# Patient Record
Sex: FEMALE | Race: ASIAN | NOT HISPANIC OR LATINO | Employment: UNEMPLOYED | URBAN - METROPOLITAN AREA
[De-identification: names, ages, dates, MRNs, and addresses within clinical notes are randomized per-mention and may not be internally consistent; named-entity substitution may affect disease eponyms.]

---

## 2024-02-20 NOTE — PROGRESS NOTES
"HPI   34 yo presents as new pt for hyperthyroidism.      Pt is 10 months post partum.  -dec 23: tsh-0.018, of note pt had normal tsh aug 2023  -no obstructive goiter sx, no eye sx  -euthyroid by history, some hair loss, occ nausea  -having periods again starting jan 2024      Pmh/psh:  -as above  FH:  Mom-thyroid disease  Dad-dm2, cvd  Siblings-healthy  SH: -tobacco, -etoh, -illicit substances        Current Outpatient Medications:     prenatal no115/iron/folic acid (PRENATAL 19 ORAL), Take by mouth., Disp: , Rfl:       Allergies as of 02/22/2024 - Reviewed 02/22/2024   Allergen Reaction Noted    Tylenol pm [diphenhydramine-acetaminophen] Rash 02/22/2024         Review of Systems   Cardiology: Lightheadedness-denies.  Chest pain-denies.  Leg edema-denies.  Palpitations-denies.  Respiratory: Cough-denies. Shortness of breath-denies.  Wheezing-denies.  Gastroenterology: Constipation-denies.  Diarrhea-denies.  Heartburn-denies.  Endocrinology: Cold intolerance-denies.  Heat intolerance-denies.  Sweats-denies.  Neurology: Headache-denies.  Tremor-denies.  Neuropathy in extremities-denies.  Psychology: Low energy-denies.  Irritability-denies.  Sleep disturbances-variable      BP 96/70 (BP Location: Left arm)   Pulse 89   Ht 1.537 m (5' 0.5\")   Wt 62.1 kg (137 lb)   BMI 26.32 kg/m²       Labs:    Lab Results   Component Value Date    HGBA1C 5.5 11/10/2021         Physical Exam   General Appearance: pleasant, cooperative, no acute distress  HEENT: no chemosis, no proptosis, no lid lag, no lid retraction  Neck: no lymphadenopathy, no thyromegaly, no dominant thyroid nodules  Heart: no murmurs, regular rate and rhythm, S1 and S2  Lungs: no wheezes, no rhonci, no rales  Extremities: no lower extremity swelling      Assessment/Plan   1. Hyperthyroidism  -ddx thyroiditis vs Graves disease  -based on sx/hx/exam suspect thyroiditis  -check tsh/t4/t4/tsi/tpo  -if thyroiditis would follow up in 3 months and then follow levels " yearly with pcp  -if Graves' likely start low dose methimazole, should be ok with breast feeding    Follow Up:  Lazara 3 months    -labs/tests/notes reviewed  -reviewed and counseled patient on medication monitoring and side effects

## 2024-02-22 ENCOUNTER — OFFICE VISIT (OUTPATIENT)
Dept: ENDOCRINOLOGY | Facility: CLINIC | Age: 34
End: 2024-02-22
Payer: COMMERCIAL

## 2024-02-22 ENCOUNTER — LAB (OUTPATIENT)
Dept: LAB | Facility: LAB | Age: 34
End: 2024-02-22
Payer: COMMERCIAL

## 2024-02-22 VITALS
HEART RATE: 89 BPM | WEIGHT: 137 LBS | DIASTOLIC BLOOD PRESSURE: 70 MMHG | HEIGHT: 61 IN | SYSTOLIC BLOOD PRESSURE: 96 MMHG | BODY MASS INDEX: 25.86 KG/M2

## 2024-02-22 DIAGNOSIS — E05.90 HYPERTHYROIDISM: Primary | ICD-10-CM

## 2024-02-22 DIAGNOSIS — E05.90 HYPERTHYROIDISM: ICD-10-CM

## 2024-02-22 LAB
T3FREE SERPL-MCNC: 3.5 PG/ML (ref 2.3–4.2)
THYROPEROXIDASE AB SERPL-ACNC: <28 IU/ML
TSH SERPL-ACNC: 1.81 MIU/L (ref 0.44–3.98)

## 2024-02-22 PROCEDURE — 36415 COLL VENOUS BLD VENIPUNCTURE: CPT

## 2024-02-22 PROCEDURE — 84481 FREE ASSAY (FT-3): CPT

## 2024-02-22 PROCEDURE — 84443 ASSAY THYROID STIM HORMONE: CPT

## 2024-02-22 PROCEDURE — 84445 ASSAY OF TSI GLOBULIN: CPT

## 2024-02-22 PROCEDURE — 99203 OFFICE O/P NEW LOW 30 MIN: CPT | Performed by: INTERNAL MEDICINE

## 2024-02-22 PROCEDURE — 1036F TOBACCO NON-USER: CPT | Performed by: INTERNAL MEDICINE

## 2024-02-22 PROCEDURE — 86376 MICROSOMAL ANTIBODY EACH: CPT

## 2024-02-22 RX ORDER — OMEPRAZOLE 20 MG/1
20 CAPSULE, DELAYED RELEASE ORAL 2 TIMES DAILY
COMMUNITY
Start: 2023-11-29 | End: 2024-02-22

## 2024-02-22 ASSESSMENT — ENCOUNTER SYMPTOMS
OCCASIONAL FEELINGS OF UNSTEADINESS: 0
LOSS OF SENSATION IN FEET: 0
DEPRESSION: 0

## 2024-02-22 ASSESSMENT — PAIN SCALES - GENERAL: PAINLEVEL: 0-NO PAIN

## 2024-02-22 ASSESSMENT — PATIENT HEALTH QUESTIONNAIRE - PHQ9
2. FEELING DOWN, DEPRESSED OR HOPELESS: NOT AT ALL
SUM OF ALL RESPONSES TO PHQ9 QUESTIONS 1 & 2: 0
1. LITTLE INTEREST OR PLEASURE IN DOING THINGS: NOT AT ALL

## 2024-02-27 LAB — TSI SER-ACNC: <1 TSI INDEX

## 2024-02-28 DIAGNOSIS — E05.90 HYPERTHYROIDISM: Primary | ICD-10-CM

## 2024-05-23 ENCOUNTER — APPOINTMENT (OUTPATIENT)
Dept: ENDOCRINOLOGY | Facility: CLINIC | Age: 34
End: 2024-05-23
Payer: COMMERCIAL